# Patient Record
Sex: MALE | Race: WHITE | NOT HISPANIC OR LATINO
[De-identification: names, ages, dates, MRNs, and addresses within clinical notes are randomized per-mention and may not be internally consistent; named-entity substitution may affect disease eponyms.]

---

## 2022-07-06 ENCOUNTER — APPOINTMENT (OUTPATIENT)
Dept: SURGERY | Facility: CLINIC | Age: 67
End: 2022-07-06

## 2022-07-06 VITALS
WEIGHT: 155 LBS | OXYGEN SATURATION: 99 % | BODY MASS INDEX: 23.49 KG/M2 | TEMPERATURE: 97 F | DIASTOLIC BLOOD PRESSURE: 80 MMHG | SYSTOLIC BLOOD PRESSURE: 164 MMHG | RESPIRATION RATE: 18 BRPM | HEIGHT: 68 IN | HEART RATE: 67 BPM

## 2022-07-06 DIAGNOSIS — Z86.39 PERSONAL HISTORY OF OTHER ENDOCRINE, NUTRITIONAL AND METABOLIC DISEASE: ICD-10-CM

## 2022-07-06 DIAGNOSIS — Z82.49 FAMILY HISTORY OF ISCHEMIC HEART DISEASE AND OTHER DISEASES OF THE CIRCULATORY SYSTEM: ICD-10-CM

## 2022-07-06 DIAGNOSIS — I83.90 ASYMPTOMATIC VARICOSE VEINS OF UNSPECIFIED LOWER EXTREMITY: ICD-10-CM

## 2022-07-06 DIAGNOSIS — Z22.322 CARRIER OR SUSPECTED CARRIER OF METHICILLIN RESISTANT STAPHYLOCOCCUS AUREUS: ICD-10-CM

## 2022-07-06 DIAGNOSIS — Z78.9 OTHER SPECIFIED HEALTH STATUS: ICD-10-CM

## 2022-07-06 DIAGNOSIS — K42.9 UMBILICAL HERNIA W/OUT OBSTRUCTION OR GANGRENE: ICD-10-CM

## 2022-07-06 DIAGNOSIS — Z83.3 FAMILY HISTORY OF DIABETES MELLITUS: ICD-10-CM

## 2022-07-06 DIAGNOSIS — Z82.3 FAMILY HISTORY OF STROKE: ICD-10-CM

## 2022-07-06 DIAGNOSIS — Z22.321 CARRIER OR SUSPECTED CARRIER OF METHICILLIN SUSCEPTIBLE STAPHYLOCOCCUS AUREUS: ICD-10-CM

## 2022-07-06 PROCEDURE — 99204 OFFICE O/P NEW MOD 45 MIN: CPT

## 2022-07-06 RX ORDER — ATENOLOL 50 MG/1
50 TABLET ORAL
Refills: 0 | Status: ACTIVE | COMMUNITY

## 2022-07-06 NOTE — HISTORY OF PRESENT ILLNESS
[de-identified] : Adrián is a 68 y/o male here for  consultation. \par \par CT A/P 12/08/21 demonstrated stable small left inguinal hernia containing fat and periumbilical hernia containing fat. [de-identified] : About a month ago patient experienced some left groin discomfort and subsequently noted above. He has also been aware of a small bulge at the umbilicus for many years but has no symptoms related to it. Status post open appendectomy during childhood and has noted a bulge above the right-sided scar ever since but again, has no symptoms relative to it. Recent CT scan demonstrated left inguinal hernia as well as small umbilical hernia and what was described as a right spigelian hernia containing fat.

## 2022-07-06 NOTE — PLAN
[FreeTextEntry1] : Advised elective repair of left inguinal hernia in ambulatory OR. Discussed with patient nature of, indications for and risks/benefits of surgery. Since umbilical and incisional hernias or both long-standing and completely asymptomatic, no repair advised.

## 2022-07-06 NOTE — PHYSICAL EXAM
[de-identified] : Soft, nondistended, nontender no palpable mass or organomegaly. Well-healed surgical scar across right mid abdomen with mobile, fatty, subcutaneous mass above the scar. At the umbilicus, 1.5 cm nontender, reducible, fat-containing hernia. Right groin-  no palpable hernia. Left groin-  moderate sized, nontender, reducible inguinal hernia.

## 2022-07-06 NOTE — ASSESSMENT
[FreeTextEntry1] : 67-year-old male with recently noted, symptomatic left inguinal hernia as well as long-standing, totally asymptomatic, fat-containing umbilical and incisional hernias.

## 2022-07-07 LAB
MRSA SPEC QL CULT: NOT DETECTED
STAPH AUREUS (SA): NOT DETECTED

## 2022-08-04 DIAGNOSIS — Z00.00 ENCOUNTER FOR GENERAL ADULT MEDICAL EXAMINATION W/OUT ABNORMAL FINDINGS: ICD-10-CM

## 2022-08-09 ENCOUNTER — OUTPATIENT (OUTPATIENT)
Dept: OUTPATIENT SERVICES | Facility: HOSPITAL | Age: 67
LOS: 1 days | End: 2022-08-09
Payer: MEDICARE

## 2022-08-09 VITALS
HEIGHT: 68 IN | RESPIRATION RATE: 20 BRPM | TEMPERATURE: 98 F | SYSTOLIC BLOOD PRESSURE: 145 MMHG | HEART RATE: 64 BPM | DIASTOLIC BLOOD PRESSURE: 79 MMHG | WEIGHT: 151.02 LBS | OXYGEN SATURATION: 98 %

## 2022-08-09 DIAGNOSIS — K40.90 UNILATERAL INGUINAL HERNIA, WITHOUT OBSTRUCTION OR GANGRENE, NOT SPECIFIED AS RECURRENT: ICD-10-CM

## 2022-08-09 DIAGNOSIS — Z01.818 ENCOUNTER FOR OTHER PREPROCEDURAL EXAMINATION: ICD-10-CM

## 2022-08-09 LAB
ANION GAP SERPL CALC-SCNC: 13 MMOL/L — SIGNIFICANT CHANGE UP (ref 5–17)
BUN SERPL-MCNC: 15 MG/DL — SIGNIFICANT CHANGE UP (ref 7–23)
CALCIUM SERPL-MCNC: 9.4 MG/DL — SIGNIFICANT CHANGE UP (ref 8.4–10.5)
CHLORIDE SERPL-SCNC: 98 MMOL/L — SIGNIFICANT CHANGE UP (ref 96–108)
CO2 SERPL-SCNC: 24 MMOL/L — SIGNIFICANT CHANGE UP (ref 22–31)
CREAT SERPL-MCNC: 1.06 MG/DL — SIGNIFICANT CHANGE UP (ref 0.5–1.3)
EGFR: 76 ML/MIN/1.73M2 — SIGNIFICANT CHANGE UP
GLUCOSE SERPL-MCNC: 99 MG/DL — SIGNIFICANT CHANGE UP (ref 70–99)
HCT VFR BLD CALC: 43.2 % — SIGNIFICANT CHANGE UP (ref 39–50)
HGB BLD-MCNC: 14.7 G/DL — SIGNIFICANT CHANGE UP (ref 13–17)
MCHC RBC-ENTMCNC: 31.4 PG — SIGNIFICANT CHANGE UP (ref 27–34)
MCHC RBC-ENTMCNC: 34 GM/DL — SIGNIFICANT CHANGE UP (ref 32–36)
MCV RBC AUTO: 92.3 FL — SIGNIFICANT CHANGE UP (ref 80–100)
NRBC # BLD: 0 /100 WBCS — SIGNIFICANT CHANGE UP (ref 0–0)
PLATELET # BLD AUTO: 291 K/UL — SIGNIFICANT CHANGE UP (ref 150–400)
POTASSIUM SERPL-MCNC: 4.4 MMOL/L — SIGNIFICANT CHANGE UP (ref 3.5–5.3)
POTASSIUM SERPL-SCNC: 4.4 MMOL/L — SIGNIFICANT CHANGE UP (ref 3.5–5.3)
RBC # BLD: 4.68 M/UL — SIGNIFICANT CHANGE UP (ref 4.2–5.8)
RBC # FLD: 12.7 % — SIGNIFICANT CHANGE UP (ref 10.3–14.5)
SODIUM SERPL-SCNC: 135 MMOL/L — SIGNIFICANT CHANGE UP (ref 135–145)
WBC # BLD: 6.76 K/UL — SIGNIFICANT CHANGE UP (ref 3.8–10.5)
WBC # FLD AUTO: 6.76 K/UL — SIGNIFICANT CHANGE UP (ref 3.8–10.5)

## 2022-08-09 PROCEDURE — G0463: CPT

## 2022-08-09 PROCEDURE — 85027 COMPLETE CBC AUTOMATED: CPT

## 2022-08-09 PROCEDURE — 80048 BASIC METABOLIC PNL TOTAL CA: CPT

## 2022-08-09 NOTE — H&P PST ADULT - NEUROLOGICAL
negative normal/cranial nerves II-XII intact/sensation intact/responds to pain/responds to verbal commands

## 2022-08-09 NOTE — H&P PST ADULT - ASSESSMENT
Problem: Left Inguinal hernia   Plan: Left inguinal hernia repair    Problem 2: DVT  Plan: H/O DVT ( LAST 2020) to stop Eliquis 48 hrs prior to surgery - as per hematology recommendations     Problem 3: HTN  Plan: Continue on antihypertensive medication

## 2022-08-09 NOTE — H&P PST ADULT - HISTORY OF PRESENT ILLNESS
67 yr old male helen history of    Denies Recent travel, Exposure or Covid symptoms  Covid test- 8/20/2022  [place] issues with H &P , Unable to enter PMH,PSH ,WEIGHT, Problem list & plan      PMH: DVT ( 2011, 2020 )- On ELIQUIS , COPD , Ex smoker quit  20 yrs , HTN, Hyperlipidemia, Left inguinal hernia , Varicose veins , Osteoarthritis of Knee , BPH , Lumbar disc disease     PSH : S/P APPENDECTOMY    67 yr old male helen history of    Denies Recent travel, Exposure or Covid symptoms  Covid test- 8/20/2022  [place] issues with H &P , Unable to enter PMH,PSH ,WEIGHT, Problem list & plan      PMH: DVT ( 2011, 2020 )- On ELIQUIS , COPD , Ex smoker quit  20 yrs , HTN, Hyperlipidemia, Left inguinal hernia , Varicose veins , Osteoarthritis of Knee , BPH , Lumbar disc disease     PSH : S/P APPENDECTOMY    67 yr old male with history of inguinal hernia for several years, with c/o bulging , Coming in for Left inguinal hernia repair on 8/23/2022.     Denies Recent travel, Exposure or Covid symptoms  Covid test- 8/20/2022  [place] issues with H &P , Unable to enter PMH,PSH ,WEIGHT, Problem list & plan      PMH: DVT ( 2011, 2020 )- On ELIQUIS , COPD , Ex smoker quit  20 yrs , HTN, Hyperlipidemia, Left inguinal hernia , Varicose veins , Osteoarthritis of Knee , BPH , Lumbar disc disease     PSH : S/P APPENDECTOMY

## 2022-10-04 ENCOUNTER — OUTPATIENT (OUTPATIENT)
Dept: OUTPATIENT SERVICES | Facility: HOSPITAL | Age: 67
LOS: 1 days | End: 2022-10-04
Payer: MEDICARE

## 2022-10-04 VITALS
TEMPERATURE: 98 F | OXYGEN SATURATION: 96 % | HEART RATE: 76 BPM | DIASTOLIC BLOOD PRESSURE: 85 MMHG | SYSTOLIC BLOOD PRESSURE: 144 MMHG | HEIGHT: 68 IN | RESPIRATION RATE: 16 BRPM | WEIGHT: 151.9 LBS

## 2022-10-04 DIAGNOSIS — Z90.49 ACQUIRED ABSENCE OF OTHER SPECIFIED PARTS OF DIGESTIVE TRACT: Chronic | ICD-10-CM

## 2022-10-04 DIAGNOSIS — Z01.818 ENCOUNTER FOR OTHER PREPROCEDURAL EXAMINATION: ICD-10-CM

## 2022-10-04 DIAGNOSIS — Z86.718 PERSONAL HISTORY OF OTHER VENOUS THROMBOSIS AND EMBOLISM: ICD-10-CM

## 2022-10-04 DIAGNOSIS — K40.90 UNILATERAL INGUINAL HERNIA, WITHOUT OBSTRUCTION OR GANGRENE, NOT SPECIFIED AS RECURRENT: ICD-10-CM

## 2022-10-04 LAB
ANION GAP SERPL CALC-SCNC: 16 MMOL/L — SIGNIFICANT CHANGE UP (ref 5–17)
BUN SERPL-MCNC: 13 MG/DL — SIGNIFICANT CHANGE UP (ref 7–23)
CALCIUM SERPL-MCNC: 9.2 MG/DL — SIGNIFICANT CHANGE UP (ref 8.4–10.5)
CHLORIDE SERPL-SCNC: 100 MMOL/L — SIGNIFICANT CHANGE UP (ref 96–108)
CO2 SERPL-SCNC: 21 MMOL/L — LOW (ref 22–31)
CREAT SERPL-MCNC: 0.91 MG/DL — SIGNIFICANT CHANGE UP (ref 0.5–1.3)
EGFR: 91 ML/MIN/1.73M2 — SIGNIFICANT CHANGE UP
GLUCOSE SERPL-MCNC: 97 MG/DL — SIGNIFICANT CHANGE UP (ref 70–99)
HCT VFR BLD CALC: 43 % — SIGNIFICANT CHANGE UP (ref 39–50)
HGB BLD-MCNC: 14.8 G/DL — SIGNIFICANT CHANGE UP (ref 13–17)
MCHC RBC-ENTMCNC: 31.4 PG — SIGNIFICANT CHANGE UP (ref 27–34)
MCHC RBC-ENTMCNC: 34.4 GM/DL — SIGNIFICANT CHANGE UP (ref 32–36)
MCV RBC AUTO: 91.3 FL — SIGNIFICANT CHANGE UP (ref 80–100)
NRBC # BLD: 0 /100 WBCS — SIGNIFICANT CHANGE UP (ref 0–0)
PLATELET # BLD AUTO: 289 K/UL — SIGNIFICANT CHANGE UP (ref 150–400)
POTASSIUM SERPL-MCNC: 4.1 MMOL/L — SIGNIFICANT CHANGE UP (ref 3.5–5.3)
POTASSIUM SERPL-SCNC: 4.1 MMOL/L — SIGNIFICANT CHANGE UP (ref 3.5–5.3)
RBC # BLD: 4.71 M/UL — SIGNIFICANT CHANGE UP (ref 4.2–5.8)
RBC # FLD: 13 % — SIGNIFICANT CHANGE UP (ref 10.3–14.5)
SODIUM SERPL-SCNC: 137 MMOL/L — SIGNIFICANT CHANGE UP (ref 135–145)
WBC # BLD: 8.57 K/UL — SIGNIFICANT CHANGE UP (ref 3.8–10.5)
WBC # FLD AUTO: 8.57 K/UL — SIGNIFICANT CHANGE UP (ref 3.8–10.5)

## 2022-10-04 PROCEDURE — G0463: CPT

## 2022-10-04 PROCEDURE — 80048 BASIC METABOLIC PNL TOTAL CA: CPT

## 2022-10-04 PROCEDURE — 85027 COMPLETE CBC AUTOMATED: CPT

## 2022-10-04 PROCEDURE — 36415 COLL VENOUS BLD VENIPUNCTURE: CPT

## 2022-10-04 RX ORDER — SODIUM CHLORIDE 9 MG/ML
1000 INJECTION, SOLUTION INTRAVENOUS
Refills: 0 | Status: DISCONTINUED | OUTPATIENT
Start: 2022-10-25 | End: 2022-11-08

## 2022-10-04 NOTE — H&P PST ADULT - PROBLEM SELECTOR PLAN 1
Left inguinal hernia repair   PST instructions provided, surgical scrub given, patient verbalized understanding   CBC, BMP collected and sent   Covid PCR 10/22 @ [facility]

## 2022-10-04 NOTE — H&P PST ADULT - HISTORY OF PRESENT ILLNESS
67 yr old male with history of HTN, HLD, mild COPD ( former smoker ), DVT on Eliquis, OA , left inguinal  hernia for several years, with c/o bulging , Coming in for Left inguinal hernia repair on 10/25/2022. Denies fever, chills, no acute complaints. Denies Covid exposure. Covid PCR 10/22/2022 @ [facility].     Initially procedure was scheduled for 8/23/2022, was canceled patient had coffee with milk the morning of surgery.

## 2022-10-04 NOTE — H&P PST ADULT - FALL HARM RISK - FALLEN IN PAST
How Severe Are Your Spot(S)?: mild Have Your Spot(S) Been Treated In The Past?: has not been treated Hpi Title: Evaluation of a Skin Lesion No
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
## 2022-10-22 ENCOUNTER — OUTPATIENT (OUTPATIENT)
Dept: OUTPATIENT SERVICES | Facility: HOSPITAL | Age: 67
LOS: 1 days | End: 2022-10-22
Payer: MEDICARE

## 2022-10-22 DIAGNOSIS — Z11.52 ENCOUNTER FOR SCREENING FOR COVID-19: ICD-10-CM

## 2022-10-22 DIAGNOSIS — Z90.49 ACQUIRED ABSENCE OF OTHER SPECIFIED PARTS OF DIGESTIVE TRACT: Chronic | ICD-10-CM

## 2022-10-22 LAB — SARS-COV-2 RNA SPEC QL NAA+PROBE: SIGNIFICANT CHANGE UP

## 2022-10-22 PROCEDURE — U0005: CPT

## 2022-10-22 PROCEDURE — U0003: CPT

## 2022-10-22 PROCEDURE — C9803: CPT

## 2022-10-24 ENCOUNTER — TRANSCRIPTION ENCOUNTER (OUTPATIENT)
Age: 67
End: 2022-10-24

## 2022-10-24 RX ORDER — ONDANSETRON 8 MG/1
4 TABLET, FILM COATED ORAL ONCE
Refills: 0 | Status: DISCONTINUED | OUTPATIENT
Start: 2022-10-25 | End: 2022-11-08

## 2022-10-24 RX ORDER — OXYCODONE HYDROCHLORIDE 5 MG/1
5 TABLET ORAL ONCE
Refills: 0 | Status: DISCONTINUED | OUTPATIENT
Start: 2022-10-25 | End: 2022-10-25

## 2022-10-24 RX ORDER — FENTANYL CITRATE 50 UG/ML
25 INJECTION INTRAVENOUS
Refills: 0 | Status: DISCONTINUED | OUTPATIENT
Start: 2022-10-25 | End: 2022-10-25

## 2022-10-24 RX ORDER — OXYCODONE HYDROCHLORIDE 5 MG/1
10 TABLET ORAL ONCE
Refills: 0 | Status: DISCONTINUED | OUTPATIENT
Start: 2022-10-25 | End: 2022-10-25

## 2022-10-25 ENCOUNTER — APPOINTMENT (OUTPATIENT)
Dept: SURGERY | Facility: HOSPITAL | Age: 67
End: 2022-10-25

## 2022-10-25 ENCOUNTER — RESULT REVIEW (OUTPATIENT)
Age: 67
End: 2022-10-25

## 2022-10-25 ENCOUNTER — TRANSCRIPTION ENCOUNTER (OUTPATIENT)
Age: 67
End: 2022-10-25

## 2022-10-25 ENCOUNTER — OUTPATIENT (OUTPATIENT)
Dept: OUTPATIENT SERVICES | Facility: HOSPITAL | Age: 67
LOS: 1 days | End: 2022-10-25
Payer: MEDICARE

## 2022-10-25 VITALS
TEMPERATURE: 97 F | OXYGEN SATURATION: 98 % | SYSTOLIC BLOOD PRESSURE: 141 MMHG | RESPIRATION RATE: 16 BRPM | DIASTOLIC BLOOD PRESSURE: 72 MMHG | HEART RATE: 72 BPM

## 2022-10-25 VITALS
WEIGHT: 151.02 LBS | RESPIRATION RATE: 18 BRPM | TEMPERATURE: 98 F | DIASTOLIC BLOOD PRESSURE: 75 MMHG | HEIGHT: 68 IN | SYSTOLIC BLOOD PRESSURE: 162 MMHG | OXYGEN SATURATION: 97 % | HEART RATE: 64 BPM

## 2022-10-25 DIAGNOSIS — K40.90 UNILATERAL INGUINAL HERNIA, WITHOUT OBSTRUCTION OR GANGRENE, NOT SPECIFIED AS RECURRENT: ICD-10-CM

## 2022-10-25 DIAGNOSIS — Z90.49 ACQUIRED ABSENCE OF OTHER SPECIFIED PARTS OF DIGESTIVE TRACT: Chronic | ICD-10-CM

## 2022-10-25 PROCEDURE — C1781: CPT

## 2022-10-25 PROCEDURE — C1889: CPT

## 2022-10-25 PROCEDURE — 49505 PRP I/HERN INIT REDUC >5 YR: CPT | Mod: LT

## 2022-10-25 PROCEDURE — 88302 TISSUE EXAM BY PATHOLOGIST: CPT

## 2022-10-25 PROCEDURE — 88302 TISSUE EXAM BY PATHOLOGIST: CPT | Mod: 26

## 2022-10-25 DEVICE — MESH HERNIA VENTRIO OVAL 11X14CM MED: Type: IMPLANTABLE DEVICE | Status: FUNCTIONAL

## 2022-10-25 DEVICE — SURGICEL POWDER 3 GRAMS: Type: IMPLANTABLE DEVICE | Status: FUNCTIONAL

## 2022-10-25 RX ORDER — APIXABAN 2.5 MG/1
1 TABLET, FILM COATED ORAL
Qty: 0 | Refills: 0 | DISCHARGE

## 2022-10-25 RX ORDER — ATENOLOL 25 MG/1
75 TABLET ORAL
Qty: 0 | Refills: 0 | DISCHARGE

## 2022-10-25 RX ORDER — CEFAZOLIN SODIUM 1 G
2000 VIAL (EA) INJECTION ONCE
Refills: 0 | Status: COMPLETED | OUTPATIENT
Start: 2022-10-25 | End: 2022-10-25

## 2022-10-25 RX ORDER — LIDOCAINE HCL 20 MG/ML
0.2 VIAL (ML) INJECTION ONCE
Refills: 0 | Status: COMPLETED | OUTPATIENT
Start: 2022-10-25 | End: 2022-10-25

## 2022-10-25 RX ORDER — CHLORHEXIDINE GLUCONATE 213 G/1000ML
1 SOLUTION TOPICAL ONCE
Refills: 0 | Status: COMPLETED | OUTPATIENT
Start: 2022-10-25 | End: 2022-10-25

## 2022-10-25 RX ORDER — ATORVASTATIN CALCIUM 80 MG/1
1 TABLET, FILM COATED ORAL
Qty: 0 | Refills: 0 | DISCHARGE

## 2022-10-25 RX ORDER — LOSARTAN POTASSIUM 100 MG/1
1 TABLET, FILM COATED ORAL
Qty: 0 | Refills: 0 | DISCHARGE

## 2022-10-25 RX ORDER — OXYCODONE HYDROCHLORIDE 5 MG/1
1 TABLET ORAL
Qty: 5 | Refills: 0
Start: 2022-10-25 | End: 2022-10-25

## 2022-10-25 RX ADMIN — CHLORHEXIDINE GLUCONATE 1 APPLICATION(S): 213 SOLUTION TOPICAL at 06:50

## 2022-10-25 RX ADMIN — SODIUM CHLORIDE 100 MILLILITER(S): 9 INJECTION, SOLUTION INTRAVENOUS at 06:50

## 2022-10-25 NOTE — ASU PATIENT PROFILE, ADULT - FALL HARM RISK - HARM RISK INTERVENTIONS

## 2022-10-25 NOTE — ASU DISCHARGE PLAN (ADULT/PEDIATRIC) - ASU DC SPECIAL INSTRUCTIONSFT
WOUND CARE: Keep the outer dressing on for 48 hours. Once you remove it, you will see steri strips underneath. Do not pick off the steri strips, they will fall off on their own.   BATHING: You may shower and/or sponge bathe.   ACTIVITY: No heavy lifting anything more than 10-15lbs or straining. Otherwise, you may return to your usual level of physical activity. If you are taking narcotic pain medication (such as Oxycodone), do NOT drive a car, operate machinery or make important decisions.  NOTIFY YOUR SURGEON IF: You have any bleeding that does not stop, any fever (over 100.4 F) or chills, persistent nausea/vomiting with inability to tolerate food or liquids, persistent diarrhea, or if your pain is not controlled on your discharge pain medications.  FOLLOW-UP:  Please follow up with Dr. Escobar in 2 weeks regarding your surgery.   You may resume your Eliquis on 10/26 evening.   You may take tylenol for pain, oxycodone for severe pain. Do not take any NSAIDS (aleve, advil, motrin)

## 2022-10-25 NOTE — BRIEF OPERATIVE NOTE - NSICDXBRIEFPROCEDURE_GEN_ALL_CORE_FT
PROCEDURES:  Repair, hernia, inguinal, open, using mesh, adult 25-Oct-2022 11:44:37  Poornima Cohen

## 2022-10-25 NOTE — ASU PATIENT PROFILE, ADULT - NS TRANSFER PATIENT BELONGINGS
Pt transferred to [address] [place] [address] with all belongings for tele monitoring. Awaiting orders Adenosine 6 mg IVP by intensivist. Pt broke out of it - NSR 80-90's.  Will continue to monitor patient closely. no new orders received, continue to monitor keys, ID/Cell Phone/PDA (specify)/Other belongings/Clothing

## 2022-10-25 NOTE — ASU DISCHARGE PLAN (ADULT/PEDIATRIC) - NS MD DC FALL RISK RISK
For information on Fall & Injury Prevention, visit: [email or web address] OR  [email or web address] OR  https://www.cdc.gov/steadi/patient.html

## 2022-10-28 LAB — SURGICAL PATHOLOGY STUDY: SIGNIFICANT CHANGE UP

## 2022-11-01 PROBLEM — E78.5 HYPERLIPIDEMIA, UNSPECIFIED: Chronic | Status: ACTIVE | Noted: 2022-10-04

## 2022-11-01 PROBLEM — Z87.891 PERSONAL HISTORY OF NICOTINE DEPENDENCE: Chronic | Status: ACTIVE | Noted: 2022-10-04

## 2022-11-01 PROBLEM — M48.061 SPINAL STENOSIS, LUMBAR REGION WITHOUT NEUROGENIC CLAUDICATION: Chronic | Status: ACTIVE | Noted: 2022-10-04

## 2022-11-01 PROBLEM — I10 ESSENTIAL (PRIMARY) HYPERTENSION: Chronic | Status: ACTIVE | Noted: 2022-10-04

## 2022-11-01 PROBLEM — Z86.79 PERSONAL HISTORY OF OTHER DISEASES OF THE CIRCULATORY SYSTEM: Chronic | Status: ACTIVE | Noted: 2022-10-04

## 2022-11-01 PROBLEM — N40.0 BENIGN PROSTATIC HYPERPLASIA WITHOUT LOWER URINARY TRACT SYMPTOMS: Chronic | Status: ACTIVE | Noted: 2022-10-04

## 2022-11-01 PROBLEM — K40.90 UNILATERAL INGUINAL HERNIA, WITHOUT OBSTRUCTION OR GANGRENE, NOT SPECIFIED AS RECURRENT: Chronic | Status: ACTIVE | Noted: 2022-10-04

## 2022-11-01 PROBLEM — Z86.718 PERSONAL HISTORY OF OTHER VENOUS THROMBOSIS AND EMBOLISM: Chronic | Status: ACTIVE | Noted: 2022-10-04

## 2022-11-01 PROBLEM — M17.0 BILATERAL PRIMARY OSTEOARTHRITIS OF KNEE: Chronic | Status: ACTIVE | Noted: 2022-10-04

## 2022-11-01 PROBLEM — J44.9 CHRONIC OBSTRUCTIVE PULMONARY DISEASE, UNSPECIFIED: Chronic | Status: ACTIVE | Noted: 2022-10-04

## 2022-11-09 ENCOUNTER — APPOINTMENT (OUTPATIENT)
Dept: SURGERY | Facility: CLINIC | Age: 67
End: 2022-11-09

## 2022-11-09 VITALS
OXYGEN SATURATION: 100 % | BODY MASS INDEX: 23.49 KG/M2 | DIASTOLIC BLOOD PRESSURE: 78 MMHG | HEART RATE: 60 BPM | RESPIRATION RATE: 18 BRPM | WEIGHT: 155 LBS | TEMPERATURE: 97.5 F | HEIGHT: 68 IN | SYSTOLIC BLOOD PRESSURE: 148 MMHG

## 2022-11-09 DIAGNOSIS — K40.90 UNILATERAL INGUINAL HERNIA, W/OUT OBSTRUCTION OR GANGRENE, NOT SPECIFIED AS RECURRENT: ICD-10-CM

## 2022-11-09 PROCEDURE — 99024 POSTOP FOLLOW-UP VISIT: CPT

## 2022-11-09 NOTE — HISTORY OF PRESENT ILLNESS
[de-identified] : Adrián is s 67 year old male here for post operative visit. S/P Left inguinal hernia repair with Ventrio patch 10/25/22 [de-identified] : Status post left inguinal hernia repair on 10/25/22. At present patient has no complaint of pain; used no narcotics in the postop period.

## 2022-11-09 NOTE — PHYSICAL EXAM
[de-identified] : Soft, nondistended, nontender. Left groin incision healing well with normal ridge. No palpable seroma or signs of infection. Slight induration at the level of the external inguinal ring. Left testicle normal.

## 2023-01-23 NOTE — H&P PST ADULT - HEIGHT IN FEET
Routine visit: LPN    Skill/education provided: Assessment and educated on oxygen safety, medication purpose, dosage, frequency, and side effects.    Patient/caregiver response: patient verbalized understanding of instructions    Plan for next visit: Continue with plan of care as set    Other pertinent info: n/a 5

## 2023-02-10 NOTE — H&P PST ADULT - NEGATIVE NEUROLOGICAL SYMPTOMS
Ultrasound with elastography July 2022.  Median score 5.69 indicating minimal probability of hepatic fibrosis. CT of the chest without contrast 5/9/22 incidentally noted diffusely elevated liver density, most commonly seen in the setting of primary or secondary hemochromatosis or amiodarone use. Also bilateral bronchiectasis with prominent pulmonary artery suggesting the possibility of pulmonary arterial hypertension.  Labs August 2021 show AST 45, ALT 33, , Tbili 0.4. Colonoscopy 6/24/21 by Dr. Stokes: a 20mm in the ascending colon, tattoo noted at 55cm, mild external hemorrhoids. Repeat colonoscopy recommended in 1 year. Colonoscopy 10/3/16 by Dr. Quintanilla: one 20mm sessile serrated adenoma at the hepatic flexure which was removed piecemeal, diverticulosis in the sigmoid colon, nonbleeding internal hemorrhoids. EGD was performed on 8/16/16 by Dr. Quintanilla. Findings included LA grade A reflux esophagitis, diffuse moderate inflammation characterized by congestion, erosions and erythema in the gastric body and antrum with bilious fluid found in the antrum. Gastric biopsies revealed mild inactive chronic gastritis with features of chemical/reactive gastropathy, focal intestinal metaplasia present, no H. pylori. H. pylori breath test was negative on 8/1/16. no syncope/no tremors/no vertigo

## 2024-01-10 ENCOUNTER — APPOINTMENT (OUTPATIENT)
Dept: PULMONOLOGY | Facility: CLINIC | Age: 69
End: 2024-01-10
Payer: MEDICARE

## 2024-01-10 VITALS
SYSTOLIC BLOOD PRESSURE: 136 MMHG | DIASTOLIC BLOOD PRESSURE: 78 MMHG | HEART RATE: 75 BPM | OXYGEN SATURATION: 97 % | BODY MASS INDEX: 23.49 KG/M2 | TEMPERATURE: 98.5 F | WEIGHT: 155 LBS | HEIGHT: 68 IN

## 2024-01-10 DIAGNOSIS — Z86.718 PERSONAL HISTORY OF OTHER VENOUS THROMBOSIS AND EMBOLISM: ICD-10-CM

## 2024-01-10 DIAGNOSIS — J44.9 CHRONIC OBSTRUCTIVE PULMONARY DISEASE, UNSPECIFIED: ICD-10-CM

## 2024-01-10 DIAGNOSIS — Z87.891 PERSONAL HISTORY OF NICOTINE DEPENDENCE: ICD-10-CM

## 2024-01-10 DIAGNOSIS — R93.89 ABNORMAL FINDINGS ON DIAGNOSTIC IMAGING OF OTHER SPECIFIED BODY STRUCTURES: ICD-10-CM

## 2024-01-10 PROCEDURE — 94729 DIFFUSING CAPACITY: CPT

## 2024-01-10 PROCEDURE — 94060 EVALUATION OF WHEEZING: CPT

## 2024-01-10 PROCEDURE — 94727 GAS DIL/WSHOT DETER LNG VOL: CPT

## 2024-01-10 PROCEDURE — 99204 OFFICE O/P NEW MOD 45 MIN: CPT | Mod: 25

## 2024-01-10 RX ORDER — APIXABAN 2.5 MG/1
2.5 TABLET, FILM COATED ORAL
Refills: 0 | Status: ACTIVE | COMMUNITY

## 2024-01-10 RX ORDER — LOSARTAN POTASSIUM 100 MG/1
100 TABLET, FILM COATED ORAL
Refills: 0 | Status: ACTIVE | COMMUNITY

## 2024-01-10 RX ORDER — APIXABAN 5 MG/1
TABLET, FILM COATED ORAL
Refills: 0 | Status: COMPLETED | COMMUNITY
End: 2024-01-10

## 2024-01-10 RX ORDER — ATORVASTATIN CALCIUM 20 MG/1
20 TABLET, FILM COATED ORAL
Refills: 0 | Status: ACTIVE | COMMUNITY

## 2024-01-10 NOTE — PROCEDURE
[FreeTextEntry1] : CT Chest 8/8/23: Bullous emphysematous changes most predominant in the upper lobes.  Mild biapical scarring.  No pulmonary nodules.  No pleural effusion.  No evident adenopathy.  Comparison was made to 4/15/2021 and 2/1/2020.  PFT 1/10/24: No obstruction. FEV 1 2.46L. Mild restriction. Diffusion capacity was normal. No significant improvement after bronchodilator.

## 2024-01-10 NOTE — PHYSICAL EXAM
[No Acute Distress] : no acute distress [No Neck Mass] : no neck mass [Normal Rate/Rhythm] : normal rate/rhythm [Normal S1, S2] : normal s1, s2 [Clear to Auscultation Bilaterally] : clear to auscultation bilaterally [Benign] : benign [Normal Gait] : normal gait [No Clubbing] : no clubbing [No Cyanosis] : no cyanosis [No Edema] : no edema [Normal Turgor] : normal turgor [No Focal Deficits] : no focal deficits [Oriented x3] : oriented x3 [Normal Affect] : normal affect

## 2024-01-10 NOTE — HISTORY OF PRESENT ILLNESS
[Former] : former [Never] : never [TextBox_4] : He is a 68-year-old former smoker (stopped 20 years ago) with a history of DVT, recurrent and left lower extremity, who was referred here for abnormal CT findings.  He denied any cough, wheezing or shortness of breath at rest.  He does note some dyspnea on exertion when walking up steps. [YearQuit] : 2003

## 2024-01-10 NOTE — REVIEW OF SYSTEMS
[Clotting Disorder/ Frequent bleeding] : clotting disorder/ frequent bleeding [Fever] : no fever [Fatigue] : no fatigue [Cough] : no cough [Hemoptysis] : no hemoptysis [Sputum] : no sputum [Dyspnea] : no dyspnea [Chest Discomfort] : no chest discomfort [GERD] : no gerd [Myalgias] : no myalgias [Anemia] : no anemia [Headache] : no headache [Anxiety] : no anxiety [Diabetes] : no diabetes

## 2024-01-10 NOTE — DISCUSSION/SUMMARY
[FreeTextEntry1] : He is a 68-year-old former smoker (stopped 20 years ago) with a history of DVT, recurrent and left lower extremity, who was referred here for abnormal CT findings.  He denied any cough, wheezing or shortness of breath at rest.  He does note some dyspnea on exertion when walking up steps.  Physical examination was unremarkable.  Pulmonary function was without obstructive disease.  Impression Emphysematous changes on CT scan -Appears to be asymptomatic -It is not progressive as per imaging Former smoker History of DVT -Recurrent  Recommend Given lack of symptoms no treatment was advised Follow-up CT at 1 year interval, August 2024 I will see him then

## 2024-08-12 ENCOUNTER — APPOINTMENT (OUTPATIENT)
Dept: PULMONOLOGY | Facility: CLINIC | Age: 69
End: 2024-08-12
Payer: MEDICARE

## 2024-08-12 VITALS
TEMPERATURE: 97.1 F | WEIGHT: 151 LBS | BODY MASS INDEX: 22.96 KG/M2 | DIASTOLIC BLOOD PRESSURE: 78 MMHG | SYSTOLIC BLOOD PRESSURE: 112 MMHG | HEART RATE: 68 BPM | OXYGEN SATURATION: 98 %

## 2024-08-12 DIAGNOSIS — R93.89 ABNORMAL FINDINGS ON DIAGNOSTIC IMAGING OF OTHER SPECIFIED BODY STRUCTURES: ICD-10-CM

## 2024-08-12 DIAGNOSIS — Z87.891 PERSONAL HISTORY OF NICOTINE DEPENDENCE: ICD-10-CM

## 2024-08-12 DIAGNOSIS — J86.9 PYOTHORAX W/OUT FISTULA: ICD-10-CM

## 2024-08-12 PROCEDURE — 99214 OFFICE O/P EST MOD 30 MIN: CPT

## 2024-08-12 NOTE — DISCUSSION/SUMMARY
[FreeTextEntry1] : He is a 69-year-old former smoker (stopped 20 years ago) with a history of DVT, recurrent and left lower extremity, who was referred here for abnormal CT findings.  Impression Emphysematous changes on CT scan -No obstruction on PFT -Exercise capacity is good Former smoker History of DVT  Recommend Continue observing Follow-up in 6 months - sooner if necessary

## 2024-08-12 NOTE — REVIEW OF SYSTEMS
[Clotting Disorder/ Frequent bleeding] : clotting disorder/ frequent bleeding [Edema] : edema [Fatigue] : no fatigue [Cough] : no cough [Sputum] : no sputum [Dyspnea] : no dyspnea [Wheezing] : no wheezing [Chest Discomfort] : no chest discomfort [GERD] : no gerd [Myalgias] : no myalgias [Anemia] : no anemia [Headache] : no headache [Anxiety] : no anxiety [Diabetes] : no diabetes

## 2024-08-12 NOTE — PHYSICAL EXAM
[No Acute Distress] : no acute distress [Normal Rate/Rhythm] : normal rate/rhythm [Normal S1, S2] : normal s1, s2 [Clear to Auscultation Bilaterally] : clear to auscultation bilaterally [Benign] : benign [Normal Gait] : normal gait [No Clubbing] : no clubbing [No Cyanosis] : no cyanosis [No Edema] : no edema [Normal Turgor] : normal turgor [No Focal Deficits] : no focal deficits [Oriented x3] : oriented x3 [Normal Affect] : normal affect [Normal Appearance] : normal appearance [No Resp Distress] : no resp distress [No Abnormalities] : no abnormalities [No HSM] : no hsm

## 2025-02-12 ENCOUNTER — APPOINTMENT (OUTPATIENT)
Dept: PULMONOLOGY | Facility: CLINIC | Age: 70
End: 2025-02-12

## 2025-03-24 ENCOUNTER — NON-APPOINTMENT (OUTPATIENT)
Age: 70
End: 2025-03-24

## 2025-03-24 ENCOUNTER — APPOINTMENT (OUTPATIENT)
Dept: PULMONOLOGY | Facility: CLINIC | Age: 70
End: 2025-03-24
Payer: MEDICARE

## 2025-03-24 VITALS
DIASTOLIC BLOOD PRESSURE: 72 MMHG | SYSTOLIC BLOOD PRESSURE: 136 MMHG | HEART RATE: 66 BPM | TEMPERATURE: 97.7 F | OXYGEN SATURATION: 99 % | BODY MASS INDEX: 23.57 KG/M2 | WEIGHT: 155 LBS

## 2025-03-24 DIAGNOSIS — J44.9 CHRONIC OBSTRUCTIVE PULMONARY DISEASE, UNSPECIFIED: ICD-10-CM

## 2025-03-24 DIAGNOSIS — R91.1 SOLITARY PULMONARY NODULE: ICD-10-CM

## 2025-03-24 DIAGNOSIS — R93.89 ABNORMAL FINDINGS ON DIAGNOSTIC IMAGING OF OTHER SPECIFIED BODY STRUCTURES: ICD-10-CM

## 2025-03-24 PROCEDURE — 99214 OFFICE O/P EST MOD 30 MIN: CPT

## (undated) DEVICE — SOL IRR POUR H2O 250ML

## (undated) DEVICE — DRAPE TOWEL BLUE 17" X 24"

## (undated) DEVICE — VISITEC 4X4

## (undated) DEVICE — DRSG STERISTRIPS 0.5 X 4"

## (undated) DEVICE — MARKING PEN W RULER

## (undated) DEVICE — SUT MONOCRYL 4-0 27" PS-2 UNDYED

## (undated) DEVICE — SUT POLYSORB 3-0 30" V-20 UNDYED

## (undated) DEVICE — SUT POLYSORB 3-0 18" TIES UNDYED

## (undated) DEVICE — BLADE SCALPEL SAFETYLOCK #15

## (undated) DEVICE — PACK ADULT HERNIA

## (undated) DEVICE — WARMING BLANKET UPPER ADULT

## (undated) DEVICE — SYR ASEPTO

## (undated) DEVICE — SYR LUER LOK 10CC

## (undated) DEVICE — DRAIN PENROSE .25" X 12" SILICONE

## (undated) DEVICE — DRSG TEGADERM + PAD 3.5X4"

## (undated) DEVICE — SUT POLYSORB 0 30" GS-23

## (undated) DEVICE — SOL IRR POUR NS 0.9% 500ML

## (undated) DEVICE — PREP CHLORAPREP HI-LITE ORANGE 26ML

## (undated) DEVICE — DRAPE INSTRUMENT POUCH 6.75" X 11"

## (undated) DEVICE — SUT POLYSORB 0 30" GS-21 UNDYED

## (undated) DEVICE — NDL HYPO REGULAR BEVEL 22G X 1.5" (TURQUOISE)

## (undated) DEVICE — VENODYNE/SCD SLEEVE CALF MEDIUM

## (undated) DEVICE — SPECIMEN CONTAINER 100ML

## (undated) DEVICE — GLV 7.5 DURAPRENE

## (undated) DEVICE — MEDICATION LABELS W MARKER